# Patient Record
Sex: MALE | Race: WHITE | Employment: OTHER | URBAN - METROPOLITAN AREA
[De-identification: names, ages, dates, MRNs, and addresses within clinical notes are randomized per-mention and may not be internally consistent; named-entity substitution may affect disease eponyms.]

---

## 2019-06-18 ENCOUNTER — HOSPITAL ENCOUNTER (EMERGENCY)
Age: 49
Discharge: HOME | End: 2019-06-18
Attending: EMERGENCY MEDICINE
Payer: COMMERCIAL

## 2019-06-18 VITALS
SYSTOLIC BLOOD PRESSURE: 139 MMHG | BODY MASS INDEX: 30.88 KG/M2 | TEMPERATURE: 98.1 F | HEART RATE: 60 BPM | OXYGEN SATURATION: 98 % | HEIGHT: 72 IN | DIASTOLIC BLOOD PRESSURE: 86 MMHG | WEIGHT: 227.96 LBS | RESPIRATION RATE: 16 BRPM

## 2019-06-18 DIAGNOSIS — I10 ESSENTIAL HYPERTENSION: Primary | ICD-10-CM

## 2019-06-18 LAB
ANION GAP SERPL CALCULATED.3IONS-SCNC: 11 MMOL/L (ref 3–16)
BASOPHILS ABSOLUTE: 0.1 K/UL (ref 0–0.2)
BASOPHILS RELATIVE PERCENT: 1.1 %
BUN BLDV-MCNC: 13 MG/DL (ref 7–20)
CALCIUM SERPL-MCNC: 9.1 MG/DL (ref 8.3–10.6)
CHLORIDE BLD-SCNC: 100 MMOL/L (ref 99–110)
CO2: 27 MMOL/L (ref 21–32)
CREAT SERPL-MCNC: 0.9 MG/DL (ref 0.9–1.3)
EOSINOPHILS ABSOLUTE: 0.3 K/UL (ref 0–0.6)
EOSINOPHILS RELATIVE PERCENT: 3.4 %
GFR AFRICAN AMERICAN: >60
GFR NON-AFRICAN AMERICAN: >60
GLUCOSE BLD-MCNC: 139 MG/DL (ref 70–99)
HCT VFR BLD CALC: 45.1 % (ref 40.5–52.5)
HEMOGLOBIN: 14.8 G/DL (ref 13.5–17.5)
LYMPHOCYTES ABSOLUTE: 2.3 K/UL (ref 1–5.1)
LYMPHOCYTES RELATIVE PERCENT: 29.7 %
MCH RBC QN AUTO: 29.5 PG (ref 26–34)
MCHC RBC AUTO-ENTMCNC: 32.8 G/DL (ref 31–36)
MCV RBC AUTO: 89.7 FL (ref 80–100)
MONOCYTES ABSOLUTE: 0.4 K/UL (ref 0–1.3)
MONOCYTES RELATIVE PERCENT: 5.8 %
NEUTROPHILS ABSOLUTE: 4.6 K/UL (ref 1.7–7.7)
NEUTROPHILS RELATIVE PERCENT: 60 %
PDW BLD-RTO: 12.6 % (ref 12.4–15.4)
PLATELET # BLD: 283 K/UL (ref 135–450)
PMV BLD AUTO: 8.1 FL (ref 5–10.5)
POTASSIUM REFLEX MAGNESIUM: 3.8 MMOL/L (ref 3.5–5.1)
RBC # BLD: 5.03 M/UL (ref 4.2–5.9)
SODIUM BLD-SCNC: 138 MMOL/L (ref 136–145)
WBC # BLD: 7.7 K/UL (ref 4–11)

## 2019-06-18 PROCEDURE — 85025 COMPLETE CBC W/AUTO DIFF WBC: CPT

## 2019-06-18 PROCEDURE — 36415 COLL VENOUS BLD VENIPUNCTURE: CPT

## 2019-06-18 PROCEDURE — 99283 EMERGENCY DEPT VISIT LOW MDM: CPT

## 2019-06-18 PROCEDURE — 80048 BASIC METABOLIC PNL TOTAL CA: CPT

## 2019-06-18 RX ORDER — AMLODIPINE BESYLATE 5 MG/1
5 TABLET ORAL DAILY
Qty: 30 TABLET | Refills: 0 | Status: SHIPPED | OUTPATIENT
Start: 2019-06-18

## 2019-06-18 RX ORDER — DEXTROAMPHETAMINE SACCHARATE, AMPHETAMINE ASPARTATE, DEXTROAMPHETAMINE SULFATE AND AMPHETAMINE SULFATE 5; 5; 5; 5 MG/1; MG/1; MG/1; MG/1
20 TABLET ORAL
COMMUNITY
Start: 2018-10-30

## 2019-06-18 RX ORDER — PAROXETINE HYDROCHLORIDE 20 MG/1
20 TABLET, FILM COATED ORAL EVERY MORNING
COMMUNITY

## 2019-06-18 SDOH — HEALTH STABILITY: MENTAL HEALTH: HOW OFTEN DO YOU HAVE A DRINK CONTAINING ALCOHOL?: NEVER

## 2019-06-19 NOTE — ED PROVIDER NOTES
[facility]  eMERGENCY dEPARTMENT eNCOUnter      Pt Name: Marleni Junior  MRN: [id number]  [place] 1970  Date of evaluation: 6/18/2019  Provider: Mary Beth Bradley, [facility]       Chief Complaint   Patient presents with    Hypertension         HISTORY OF PRESENT ILLNESS   (Location/Symptom, Timing/Onset, Context/Setting, Quality, Duration, Modifying Factors, Severity)  Note limiting factors. Marleni Junior is a 44 y.o. male who presents to the emergency department with complaint of elevated blood pressure at home of 174/103. He reports that he was on blood pressure medication for years ago, lisinopril 10 mg daily but he stopped smoking and modified his lifestyle and his blood pressure was \"perfect\" for years. He went to see his primary care physician 10 days ago because of an episode of vertigo described as everything was spinning associated with nausea and vomiting. During the visit he was noted to have an elevated blood pressure of 140/90 but was not prescribed medication. He was given a prescription for Antivert and a B12 shot. He does report that he has had recurrent episodes of vertigo for many years lasting 1 to 2 weeks each time, associated with nystagmus, tinnitus, hearing loss, and some nausea. With this last episode his symptoms were little bit more severe to the point that he was unable to walk without holding onto something and also had vomiting. No associated headache. No visual loss. He denies any associated focal weakness or numbness. He denies any ataxia. His symptoms have significantly improved to the point that he only has a little bit of vertigo if he turns his head rapidly. No fever or chills. No sinus drainage. No earache or sore throat. No associated chest pain heaviness pressure tightness. No shortness of breath or dyspnea on exertion. No palpitations or syncope. He states that the vertigo has almost completely resolved.     He has been checking his blood pressure periodically and tonight checked it and it was elevated prompting him to come to the hospital to get it checked and possibly a prescription for medication. HPI    Nursing Notes were reviewed. REVIEW OF SYSTEMS    (2-9 systems for level 4, 10 or more for level 5)       Constitutional: Negative for fever or chills. HENT: Negative for rhinorrhea and sore throat. Eyes: Negative for redness or drainage. Respiratory: Negative for shortness of breath or dyspnea on exertion. Cardiovascular: Negative for chest pain. Gastrointestinal: Negative for abdominal pain. Negative for vomiting or diarrhea. Genitourinary: Negative for flank pain. Negative for dysuria. Negative for hematuria. Neurological: Negative for headache. Musculoskeletal:  Negative edema. Hematological: Negative for adenopathy. Psychiatric/Behavioral: Negative for confusion. All systems are reviewed and are negative except for those listed above in the history of present illness and ROS. PAST MEDICAL HISTORY     Past Medical History:   Diagnosis Date    Anxiety     Hypertension     Vertigo          SURGICAL HISTORY     History reviewed. No pertinent surgical history. CURRENT MEDICATIONS       Previous Medications    AMPHETAMINE-DEXTROAMPHETAMINE (ADDERALL, 20MG,) 20 MG TABLET    Take 20 mg by mouth. MULTIPLE VITAMINS-MINERALS (MULTIVITAMIN ADULT PO)    Take 1 tablet by mouth daily    PAROXETINE (PAXIL) 20 MG TABLET    Take 20 mg by mouth every morning       ALLERGIES     Patient has no known allergies. FAMILY HISTORY     History reviewed. No pertinent family history.        SOCIAL HISTORY       Social History     Socioeconomic History    Marital status:      Spouse name: None    Number of children: None    Years of education: None    Highest education level: None   Occupational History    None   Social Needs    Financial resource strain: None   [place] insecurity:     Worry: None     Inability: None    Transportation needs:     Medical: None     Non-medical: None   Tobacco Use    Smoking status: Former Smoker     Types: Cigarettes     Last attempt to quit: 2014     Years since quittin.3    Smokeless tobacco: Never Used   Substance and Sexual Activity    Alcohol use: Never     Frequency: Never    Drug use: Never    Sexual activity: None   Lifestyle    Physical activity:     Days per week: None     Minutes per session: None    Stress: None   Relationships    Social connections:     Talks on phone: None     Gets together: None     Attends Religion service: None     Active member of club or organization: None     Attends meetings of clubs or organizations: None     Relationship status: None    Intimate partner violence:     Fear of current or ex partner: None     Emotionally abused: None     Physically abused: None     Forced sexual activity: None   Other Topics Concern    None   Social History Narrative    None       SCREENINGS    [place] Coma Scale  Eye Opening: Spontaneous  Best Verbal Response: Oriented  Best Motor Response: Obeys commands  [place] Coma Scale Score: 15        PHYSICAL EXAM    (up to 7 for level 4, 8 or more for level 5)     ED Triage Vitals [197]   BP Temp Temp Source Pulse Resp SpO2 Height Weight   (!) 174/103 98.1 °F (36.7 °C) Oral 63 16 99 % 6' (1.829 m) 227 lb 15.3 oz (103.4 kg)       Physical Exam   Constitutional: Awake and alert and oriented to person place and time. No apparent distress. Head: No visible evidence of trauma. Normocephalic. Eyes: Pupils equal and reactive. No photophobia. Conjunctiva normal.    HENT: Oral mucosa moist.  Airway patent. Tympanic membranes intact without erythema. Nares were clear. Pharynx without erythema. Canals were normal, clear and patent. Neck:  Soft and supple. Heart:  Regular rate and rhythm. No murmur. Lungs:  Clear to auscultation.   No wheezes, rales, or COURSE and DIFFERENTIAL DIAGNOSIS/MDM:   Vitals:    Vitals:    06/18/19 2047 06/18/19 2048 06/18/19 2135   BP: (!) 174/103 (!) 165/100 139/86   Pulse: 63  60   Resp: 16  16   Temp: 98.1 °F (36.7 °C)     TempSrc: Oral     SpO2: 99%  98%   Weight: 227 lb 15.3 oz (103.4 kg)     Height: 6' (1.829 m)         Patient presented with elevated blood pressure as noted above. He is currently asymptomatic. He is neurologically intact. I was able to induce some slight vertigo with rapid head turning to the right. However there was no nystagmus. Given the fact that he has had recurrent episodes for years and has also had some tinnitus and hearing loss with these episodes that raises the possibility of Ménière's disease. The frontal diagnosis would also include labyrinth-itis or benign positional vertigo. He has never had any imaging studies and for this reason I recommended CT head without contrast.  The patient is very worried and conscious about cost because he is currently self-pay. He is agreeable to having blood test to rule out electrolyte disturbances or anemia, but refused CT. I did discuss the possibility that if his symptoms persist he would also need MRI and possible ENT/neurology evaluation. He wishes to hold off on CT and proceed with MRI as an outpatient if his symptoms do not improve. No current clinical suspicion for acute ischemic stroke. No clinical suspicion for TIA. Blood pressure was repeated and was consistently elevated. He will be given a prescription for Norvasc. He wishes to not take lisinopril again but wishes to try something different. He will be placed on Norvasc 5 mg daily. I advised him to drink plenty of fluids. I advised him to not drive or operate machinery if feeling dizzy or lightheaded. I advised him to follow-up with his primary care physician in 1 to 2 days for reexamination.   If his condition worsens or new symptoms develop, he was advised to return immediately to the

## 2019-06-19 NOTE — ED NOTES
Patient states he use to be on blood pressure medication few years ago. He was taken off of it. He states his b/p has been creeping up.  He checked it tonight it was 170's     Laron Fairchild RN  06/18/19 2112

## 2019-06-19 NOTE — ED NOTES
Gave patient discharge instructions. He states understanding.  Patient discharged to home      Sinan Meyers RN  06/18/19 4120